# Patient Record
Sex: FEMALE | Race: WHITE | NOT HISPANIC OR LATINO | Employment: OTHER | URBAN - METROPOLITAN AREA
[De-identification: names, ages, dates, MRNs, and addresses within clinical notes are randomized per-mention and may not be internally consistent; named-entity substitution may affect disease eponyms.]

---

## 2024-04-05 ENCOUNTER — HOSPITAL ENCOUNTER (EMERGENCY)
Facility: HOSPITAL | Age: 64
Discharge: HOME | End: 2024-04-05
Attending: EMERGENCY MEDICINE
Payer: MEDICARE

## 2024-04-05 ENCOUNTER — APPOINTMENT (OUTPATIENT)
Dept: RADIOLOGY | Facility: HOSPITAL | Age: 64
End: 2024-04-05
Payer: MEDICARE

## 2024-04-05 VITALS
RESPIRATION RATE: 16 BRPM | DIASTOLIC BLOOD PRESSURE: 80 MMHG | TEMPERATURE: 97.5 F | HEART RATE: 86 BPM | BODY MASS INDEX: 28.25 KG/M2 | WEIGHT: 180 LBS | OXYGEN SATURATION: 96 % | SYSTOLIC BLOOD PRESSURE: 164 MMHG | HEIGHT: 67 IN

## 2024-04-05 DIAGNOSIS — M25.511 ACUTE PAIN OF RIGHT SHOULDER: Primary | ICD-10-CM

## 2024-04-05 PROCEDURE — 73030 X-RAY EXAM OF SHOULDER: CPT | Mod: RT

## 2024-04-05 PROCEDURE — 73030 X-RAY EXAM OF SHOULDER: CPT | Mod: RIGHT SIDE | Performed by: RADIOLOGY

## 2024-04-05 PROCEDURE — 99284 EMERGENCY DEPT VISIT MOD MDM: CPT | Performed by: EMERGENCY MEDICINE

## 2024-04-05 PROCEDURE — 99283 EMERGENCY DEPT VISIT LOW MDM: CPT | Mod: 25

## 2024-04-05 PROCEDURE — 2500000005 HC RX 250 GENERAL PHARMACY W/O HCPCS: Performed by: EMERGENCY MEDICINE

## 2024-04-05 RX ORDER — LIDOCAINE 560 MG/1
1 PATCH PERCUTANEOUS; TOPICAL; TRANSDERMAL ONCE
Status: DISCONTINUED | OUTPATIENT
Start: 2024-04-05 | End: 2024-04-05 | Stop reason: HOSPADM

## 2024-04-05 RX ORDER — LIDOCAINE 560 MG/1
1 PATCH PERCUTANEOUS; TOPICAL; TRANSDERMAL DAILY
Qty: 10 PATCH | Refills: 0 | Status: SHIPPED | OUTPATIENT
Start: 2024-04-05

## 2024-04-05 RX ADMIN — LIDOCAINE 4% 1 PATCH: 40 PATCH TOPICAL at 21:05

## 2024-04-05 ASSESSMENT — PAIN SCALES - GENERAL
PAINLEVEL_OUTOF10: 4
PAINLEVEL_OUTOF10: 10 - WORST POSSIBLE PAIN
PAINLEVEL_OUTOF10: 9

## 2024-04-05 ASSESSMENT — PAIN - FUNCTIONAL ASSESSMENT
PAIN_FUNCTIONAL_ASSESSMENT: 0-10
PAIN_FUNCTIONAL_ASSESSMENT: 0-10

## 2024-04-05 ASSESSMENT — LIFESTYLE VARIABLES
HAVE PEOPLE ANNOYED YOU BY CRITICIZING YOUR DRINKING: NO
TOTAL SCORE: 0
HAVE YOU EVER FELT YOU SHOULD CUT DOWN ON YOUR DRINKING: NO
EVER FELT BAD OR GUILTY ABOUT YOUR DRINKING: NO
EVER HAD A DRINK FIRST THING IN THE MORNING TO STEADY YOUR NERVES TO GET RID OF A HANGOVER: NO

## 2024-04-05 ASSESSMENT — COLUMBIA-SUICIDE SEVERITY RATING SCALE - C-SSRS
1. IN THE PAST MONTH, HAVE YOU WISHED YOU WERE DEAD OR WISHED YOU COULD GO TO SLEEP AND NOT WAKE UP?: NO
2. HAVE YOU ACTUALLY HAD ANY THOUGHTS OF KILLING YOURSELF?: NO
6. HAVE YOU EVER DONE ANYTHING, STARTED TO DO ANYTHING, OR PREPARED TO DO ANYTHING TO END YOUR LIFE?: NO

## 2024-04-05 ASSESSMENT — PAIN DESCRIPTION - PAIN TYPE: TYPE: ACUTE PAIN

## 2024-04-05 ASSESSMENT — PAIN DESCRIPTION - LOCATION
LOCATION: SHOULDER
LOCATION: SHOULDER

## 2024-04-05 ASSESSMENT — PAIN DESCRIPTION - ORIENTATION: ORIENTATION: RIGHT

## 2024-04-06 NOTE — ED PROVIDER NOTES
HPI   Chief Complaint   Patient presents with    Shoulder Injury     Right shoulder injury walking dog. Dog took off running and pt grabbed harness hurting shoulder.       63-year-old female presenting to the emergency department with right shoulder pain.  Patient was walking with her neighbor and her dog about 2 hours prior to arrival.  Dog took off and the patient tried to grab the dog's harness with her right arm causing her to fall over and landed on the right shoulder.  She states that she did hit her head lightly on muddy ground.  No loss of consciousness.  She not having any headache.  Patient is not on blood thinners.  No vision changes.  No numbness tingling weakness in the arms or legs.  No difficulty ambulating.  Patient Nuys any chest pain or trouble breathing.  She denies any other injuries.  No neck pain.                        [place] Coma Scale Score: 15                     Patient History   No past medical history on file.  No past surgical history on file.  No family history on file.  Social History     Tobacco Use    Smoking status: Not on file    Smokeless tobacco: Not on file   Substance Use Topics    Alcohol use: Not on file    Drug use: Not on file       Physical Exam   ED Triage Vitals [04/05/24 2002]   Temperature Heart Rate Respirations BP   36.4 °C (97.5 °F) 88 18 (!) 192/93      Pulse Ox Temp Source Heart Rate Source Patient Position   96 % Temporal -- --      BP Location FiO2 (%)     -- --       Physical Exam  Vitals and nursing note reviewed.   Constitutional:       General: She is not in acute distress.     Appearance: Normal appearance.   HENT:      Head: Normocephalic and atraumatic.      Comments: No skull or facial bone tenderness with palpation.  No raccoon eyes.  No Frey sign.  No hemotympanum.  No nasal septal hematoma.     Right Ear: Tympanic membrane normal.      Left Ear: Tympanic membrane normal.      Nose: Nose normal.      Mouth/Throat:      Mouth: Mucous membranes are  moist.   Eyes:      Extraocular Movements: Extraocular movements intact.      Pupils: Pupils are equal, round, and reactive to light.   Neck:      Comments: No C-spine tenderness to palpation  Cardiovascular:      Rate and Rhythm: Normal rate and regular rhythm.      Pulses: Normal pulses.   Pulmonary:      Effort: Pulmonary effort is normal. No respiratory distress.      Breath sounds: Normal breath sounds.   Abdominal:      General: Abdomen is flat.   Musculoskeletal:      Cervical back: Normal range of motion. No tenderness.      Comments: Limited range of motion of the right shoulder secondary to pain.  She has intact sensation equal or the bilateral lateral deltoids.  Patient has strong palpable radial pulse in the right upper extremity.  Normal sensation radial ulnar and median nerve distribution with intact strength in these distributions of the right arm as well.  No significant bony tenderness with palpation of the right shoulder.  No other bony tenderness with palpation of the right upper extremity.  No other traumatic injuries are noted   Skin:     General: Skin is warm.      Capillary Refill: Capillary refill takes less than 2 seconds.   Neurological:      General: No focal deficit present.      Mental Status: She is alert and oriented to person, place, and time.      Cranial Nerves: No cranial nerve deficit.      Sensory: No sensory deficit.      Motor: No weakness.      Gait: Gait normal.         ED Course & MDM   Diagnoses as of 04/07/24 1602   Acute pain of right shoulder       Medical Decision Making  Patient presenting with isolated right shoulder pain after a fall.  She did hit her head, but did not lose consciousness.  Not on blood thinners.  No headache.  GCS 15, alert and oriented x 4, normal neurologic exam, no clinical sign of basilar skull fracture.  Nonconcerning mechanism.  I do not feel that CT of the brain is necessary at this time.  She has no midline spinous process tenderness, normal  neurologic exam, is not intoxicated, no pain with rotation of the neck 45 degrees to the left to the right, no hyperextension or hyperflexion injury.  Do not feel that CT scan of the cervical spine is necessary at this time.      Plan to get x-ray of the right shoulder.  No other traumatic injuries are noted on examination.        Procedure  Procedures     Cristo Martinez DO  04/07/24 5072

## 2024-04-06 NOTE — DISCHARGE INSTRUCTIONS
Keep your appointment with Dr. Matthews.    Use the Lidoderm patches as directed, 12 hours on 12 hours off.    Do gentle range of motion exercises.    Return with any chest pain, shortness of breath, severe headache, vision changes, numbness tingling weakness in the arms or legs, change in mental status, or any new or worsening symptoms.   We can move patient to hospital follow up for 6/15/21 at 10 am (currently this spot is open)

## 2024-04-30 ENCOUNTER — LAB (OUTPATIENT)
Dept: LAB | Facility: LAB | Age: 64
End: 2024-04-30
Payer: MEDICARE

## 2024-04-30 ENCOUNTER — PRE-ADMISSION TESTING (OUTPATIENT)
Dept: PREADMISSION TESTING | Facility: HOSPITAL | Age: 64
End: 2024-04-30
Payer: MEDICARE

## 2024-04-30 VITALS
HEART RATE: 87 BPM | BODY MASS INDEX: 28.41 KG/M2 | WEIGHT: 181 LBS | RESPIRATION RATE: 16 BRPM | OXYGEN SATURATION: 96 % | DIASTOLIC BLOOD PRESSURE: 84 MMHG | TEMPERATURE: 98.1 F | HEIGHT: 67 IN | SYSTOLIC BLOOD PRESSURE: 161 MMHG

## 2024-04-30 DIAGNOSIS — Z01.818 PRE-OP TESTING: ICD-10-CM

## 2024-04-30 DIAGNOSIS — M75.121 COMPLETE TEAR OF RIGHT ROTATOR CUFF, UNSPECIFIED WHETHER TRAUMATIC: ICD-10-CM

## 2024-04-30 DIAGNOSIS — Z01.818 PRE-OP TESTING: Primary | ICD-10-CM

## 2024-04-30 DIAGNOSIS — R73.09 ELEVATED HEMOGLOBIN A1C: ICD-10-CM

## 2024-04-30 LAB
ANION GAP SERPL CALC-SCNC: 19 MMOL/L (ref 10–20)
BUN SERPL-MCNC: 36 MG/DL (ref 6–23)
CALCIUM SERPL-MCNC: 10.8 MG/DL (ref 8.6–10.3)
CHLORIDE SERPL-SCNC: 99 MMOL/L (ref 98–107)
CO2 SERPL-SCNC: 25 MMOL/L (ref 21–32)
CREAT SERPL-MCNC: 1.03 MG/DL (ref 0.5–1.05)
EGFRCR SERPLBLD CKD-EPI 2021: 61 ML/MIN/1.73M*2
ERYTHROCYTE [DISTWIDTH] IN BLOOD BY AUTOMATED COUNT: 20.9 % (ref 11.5–14.5)
EST. AVERAGE GLUCOSE BLD GHB EST-MCNC: 177 MG/DL
GLUCOSE SERPL-MCNC: 130 MG/DL (ref 74–99)
HBA1C MFR BLD: 7.8 %
HCT VFR BLD AUTO: 64.6 % (ref 36–46)
HGB BLD-MCNC: 19.2 G/DL (ref 12–16)
MCH RBC QN AUTO: 24.2 PG (ref 26–34)
MCHC RBC AUTO-ENTMCNC: 29.7 G/DL (ref 32–36)
MCV RBC AUTO: 81 FL (ref 80–100)
NRBC BLD-RTO: 0 /100 WBCS (ref 0–0)
PLATELET # BLD AUTO: 593 X10*3/UL (ref 150–450)
POTASSIUM SERPL-SCNC: 5.2 MMOL/L (ref 3.5–5.3)
RBC # BLD AUTO: 7.95 X10*6/UL (ref 4–5.2)
SODIUM SERPL-SCNC: 138 MMOL/L (ref 136–145)
WBC # BLD AUTO: 24.6 X10*3/UL (ref 4.4–11.3)

## 2024-04-30 PROCEDURE — 93005 ELECTROCARDIOGRAM TRACING: CPT

## 2024-04-30 PROCEDURE — 83036 HEMOGLOBIN GLYCOSYLATED A1C: CPT

## 2024-04-30 PROCEDURE — 87081 CULTURE SCREEN ONLY: CPT | Mod: STJLAB

## 2024-04-30 PROCEDURE — 99204 OFFICE O/P NEW MOD 45 MIN: CPT | Performed by: NURSE PRACTITIONER

## 2024-04-30 PROCEDURE — 80048 BASIC METABOLIC PNL TOTAL CA: CPT

## 2024-04-30 PROCEDURE — 36415 COLL VENOUS BLD VENIPUNCTURE: CPT

## 2024-04-30 PROCEDURE — 85027 COMPLETE CBC AUTOMATED: CPT

## 2024-04-30 RX ORDER — METFORMIN HYDROCHLORIDE 500 MG/1
1000 TABLET, EXTENDED RELEASE ORAL
COMMUNITY

## 2024-04-30 RX ORDER — CHLORTHALIDONE 25 MG/1
25 TABLET ORAL NIGHTLY
COMMUNITY

## 2024-04-30 RX ORDER — ATORVASTATIN CALCIUM 40 MG/1
40 TABLET, FILM COATED ORAL NIGHTLY
COMMUNITY

## 2024-04-30 RX ORDER — CHLORHEXIDINE GLUCONATE ORAL RINSE 1.2 MG/ML
SOLUTION DENTAL
Qty: 473 ML | Refills: 0 | Status: SHIPPED | OUTPATIENT
Start: 2024-04-30

## 2024-04-30 RX ORDER — LISINOPRIL 20 MG/1
20 TABLET ORAL NIGHTLY
COMMUNITY

## 2024-04-30 RX ORDER — AMLODIPINE BESYLATE 5 MG/1
10 TABLET ORAL NIGHTLY
COMMUNITY

## 2024-04-30 RX ORDER — DULAGLUTIDE 4.5 MG/.5ML
4.5 INJECTION, SOLUTION SUBCUTANEOUS
COMMUNITY

## 2024-04-30 ASSESSMENT — DUKE ACTIVITY SCORE INDEX (DASI)
CAN YOU DO YARD WORK LIKE RAKING LEAVES, WEEDING OR PUSHING A MOWER: YES
TOTAL_SCORE: 28.7
CAN YOU PARTICIPATE IN STRENOUS SPORTS LIKE SWIMMING, SINGLES TENNIS, FOOTBALL, BASKETBALL, OR SKIING: NO
CAN YOU HAVE SEXUAL RELATIONS: YES
CAN YOU DO LIGHT WORK AROUND THE HOUSE LIKE DUSTING OR WASHING DISHES: YES
CAN YOU PARTICIPATE IN MODERATE RECREATIONAL ACTIVITIES LIKE GOLF, BOWLING, DANCING, DOUBLES TENNIS OR THROWING A BASEBALL OR FOOTBALL: NO
CAN YOU TAKE CARE OF YOURSELF (EAT, DRESS, BATHE, OR USE TOILET): YES
CAN YOU DO HEAVY WORK AROUND THE HOUSE LIKE SCRUBBING FLOORS OR LIFTING AND MOVING HEAVY FURNITURE: NO
DASI METS SCORE: 6.3
CAN YOU WALK INDOORS, SUCH AS AROUND YOUR HOUSE: YES
CAN YOU DO MODERATE WORK AROUND THE HOUSE LIKE VACUUMING, SWEEPING FLOORS OR CARRYING GROCERIES: YES
CAN YOU CLIMB A FLIGHT OF STAIRS OR WALK UP A HILL: YES
CAN YOU WALK A BLOCK OR TWO ON LEVEL GROUND: YES
CAN YOU RUN A SHORT DISTANCE: NO

## 2024-04-30 ASSESSMENT — ACTIVITIES OF DAILY LIVING (ADL): ADL_SCORE: 0

## 2024-04-30 ASSESSMENT — LIFESTYLE VARIABLES: SMOKING_STATUS: NONSMOKER

## 2024-04-30 NOTE — PREPROCEDURE INSTRUCTIONS
Medication List            Accurate as of April 30, 2024 10:30 AM. Always use your most recent med list.                amLODIPine 5 mg tablet  Commonly known as: Norvasc  Medication Adjustments for Surgery: Take morning of surgery with sip of water, no other fluids     atorvastatin 40 mg tablet  Commonly known as: Lipitor  Medication Adjustments for Surgery: Other (Comment)  Notes to patient: May take the morning of surgery if this medication is prescribed to take in the mornings     chlorhexidine 0.12 % solution  Commonly known as: Peridex  Swish and spit 15 ml for 2 doses, 15mL the night before surgery and 15 ml morning of surgery - swish for 30 seconds -DO NOT SWALLOW, SPIT OUT  Medication Adjustments for Surgery: Other (Comment)  Notes to patient: As indicated     chlorthalidone 25 mg tablet  Commonly known as: Hygroton  Medication Adjustments for Surgery: Take morning of surgery with sip of water, no other fluids     lidocaine 4 % patch  Place 1 patch over 12 hours on the skin once daily. Remove & discard patch within 12 hours or as directed by MD.  Medication Adjustments for Surgery: Continue until night before surgery     lisinopril 20 mg tablet  Medication Adjustments for Surgery: Other (Comment)  Notes to patient: HOLD any evening dose the night before the day of surgery  HOLD the day of surgery  -should be discontinued for a minimum of 24 hours before surgery     metFORMIN  mg 24 hr tablet  Commonly known as: Glucophage-XR  Medication Adjustments for Surgery: Other (Comment)  Notes to patient: HOLD any evening dose the night before the day of surgery  HOLD the day of surgery     Trulicity 4.5 mg/0.5 mL pen injector  Generic drug: dulaglutide  Medication Adjustments for Surgery: Stop 7 days before surgery                  PRE-OPERATIVE INSTRUCTIONS    You will receive notification one business day prior to your procedure to confirm your arrival time. It is important that you answer your phone  and/or check your messages during this time. If you do not hear from the surgery center by 5 pm. the day before your procedure, please call [phone].     Please enter the building through the Outpatient entrance and take the elevator off the lobby to the 2nd floor then check in at the Outpatient Surgery desk on the 2nd floor.    INSTRUCTIONS:  Talk to your surgeon for instructions if you should stop your aspirin, blood thinner, or diabetes medicines.  DO NOT take any multivitamins or over the counter supplements for 7-10 days before surgery.  If not being admitted, you must have an adult immediately available to drive you home after surgery. We also highly recommend you have someone stay with you for the entire day and night of your surgery.  For children having surgery, a parent or legal guardian must accompany them to the surgery center. If this is not possible, please call [phone] to make additional arrangements.  For adults who are unable to consent or make medical decisions for themselves, a legal guardian or Power of  must accompany them to the surgery center. If this is not possible, please call [phone] to make additional arrangements.  Wear comfortable, loose fitting clothing.  All jewelry and piercings must be removed. If you are unable to remove an item or have a dermal piercing, please be sure to tell the nurse when you arrive for surgery.  Nail polish and make-up must be removed.  Avoid smoking or consuming alcohol for 24 hours before surgery.  To help prevent infection, please take a shower/bath and wash your hair the night before and/or morning of surgery (or follow other specific bathing instructions provided).    Preoperative Fasting Guidelines    Why must I stop eating and drinking near surgery time?  With sedation, food or liquid in your stomach can enter your lungs causing serious complications  Increases nausea and vomiting    When do I need to stop eating and drinking  before my surgery?  Do not eat any solid food after midnight the night before your surgery/procedure unless otherwise instructed by your surgeon.   You may have up to 13.5 ounces of clear liquid until TWO hours before your instructed arrival time to the hospital.  This includes water, black tea/coffee, (no milk or cream) apple juice, and electrolyte drinks (Gatorade).   You may chew gum until TWO hours before your surgery/procedure      If applicable, notify your surgeons office immediately of any new skin changes that occur to the surgical limb.      If you have any questions or concerns, please call Pre-Admission Testing at [phone].

## 2024-04-30 NOTE — PREPROCEDURE INSTRUCTIONS
SKIN CHECK:  Please notify your surgeon if any new skin changes occur to surgical limb.

## 2024-04-30 NOTE — CPM/PAT H&P
CPM/PAT Evaluation       Name: Aleksandra Mejía (Aleksandra Mejía)  /Age: 1960/63 y.o.     In-Person       Chief Complaint: right shoulder pains    HPI    SB is a 64 yo female who has had right shoulder pains for a few years now but pain has intensified with limited ROM. She had imaging completed which showed a complete right rotator cuff tear. She was seen by orthopedics and subsequently scheduled for right reverse total shoulder arthroplasty. NO recent steroid injections. Skin is intact to surgical limb. Otherwise denies any recent illness, fever/chills, chest pains or shortness of breath.     Past Medical History:   Diagnosis Date    Hyperlipidemia     Hypertension     Shingles     Type 2 diabetes mellitus (Multi)      PCP: Dr. Reyes ([facility])    Hemoglobin A1C   Date Value Ref Range Status   2024 7.8 (H) 4.0 - 5.6 % Final        Past Surgical History:   Procedure Laterality Date    SHOULDER SURGERY      arthroscopy- over 30 years ago       Patient  has no history on file for sexual activity.    Family History   Problem Relation Name Age of Onset    Heart disease Mother      Cancer Mother      Diabetes Mother      Hypertension Mother      Heart disease Father      Diabetes Father      Hypertension Father      Diabetes Sister      Heart disease Brother      Diabetes Brother         Allergies   Allergen Reactions    Latex Shortness of breath, Other and Swelling     Trouble breathing       Prior to Admission medications    Medication Sig Start Date End Date Taking? Authorizing Provider   amLODIPine (Norvasc) 5 mg tablet Take 2 tablets (10 mg) by mouth once daily at bedtime.    Historical Provider, MD   atorvastatin (Lipitor) 40 mg tablet Take 1 tablet (40 mg) by mouth once daily at bedtime.    Historical Provider, MD   chlorhexidine (Peridex) 0.12 % solution Swish and spit 15 ml for 2 doses, 15mL the night before surgery and 15 ml morning of surgery - swish for 30 seconds -DO NOT SWALLOW, SPIT OUT 24    Lety Vizcarra, APRN-CNP   chlorthalidone (Hygroton) 25 mg tablet Take 1 tablet (25 mg) by mouth once daily at bedtime.    Historical Provider, MD   dulaglutide (Trulicity) 4.5 mg/0.5 mL pen injector Inject 4.5 mg under the skin 1 (one) time per week. On Saturdays    Historical Provider, MD   lidocaine 4 % patch Place 1 patch over 12 hours on the skin once daily. Remove & discard patch within 12 hours or as directed by MD. 4/5/24   Lazaro Solomon,    lisinopril 20 mg tablet Take 1 tablet (20 mg) by mouth once daily at bedtime.    Historical Provider, MD   metFORMIN  mg 24 hr tablet Take 2 tablets (1,000 mg) by mouth 2 times a day with meals.    Historical Provider, MD KEITH BURROWS   Constitutional: Negative for fever, chills, or sweats   ENMT: Negative for nasal discharge, congestion, ear pain, mouth pain, throat pain   Respiratory: Negative for cough, wheezing, shortness of breath   Cardiac: Negative for chest pain, dyspnea on exertion, palpitations   Gastrointestinal: Negative for nausea, vomiting, diarrhea, constipation, abdominal pain  Genitourinary: Negative for dysuria, flank pain, frequency, hematuria     Musculoskeletal: Positive for decreased ROM, pain, swelling, weakness in right shoulder    Neurological: Negative for dizziness, confusion, headache  Psychiatric: Negative for mood changes   Skin: Negative for itching, rash, ulcer    Hematologic/Lymph: Negative for bruising, easy bleeding  Allergic/Immunologic: Negative itching, sneezing, swelling      Physical Exam  HENT:      Head: Normocephalic.      Mouth/Throat:      Mouth: Mucous membranes are moist.   Eyes:      Extraocular Movements: Extraocular movements intact.   Cardiovascular:      Rate and Rhythm: Normal rate and regular rhythm.   Pulmonary:      Effort: Pulmonary effort is normal.      Breath sounds: Normal breath sounds.   Abdominal:      General: Abdomen is flat.      Palpations: Abdomen is soft.   Musculoskeletal:         General:  Normal range of motion.      Cervical back: Normal range of motion.   Skin:     General: Skin is warm and dry.   Neurological:      General: No focal deficit present.      Mental Status: She is alert.   Psychiatric:         Mood and Affect: Mood normal.        PAT AIRWAY:   Airway:     Neck ROM::  Full  normal      Anesthesia:  Patient denies any anesthesia complications.   -shoulder arthroscopy was about 30 years ago     Visit Vitals  /84   Pulse 87   Temp 36.7 °C (98.1 °F) (Temporal)   Resp 16       DASI Risk Score      Flowsheet Row Most Recent Value   DASI SCORE 28.7   METS Score (Will be calculated only when all the questions are answered) 6.3          Caprini DVT Assessment      Flowsheet Row Most Recent Value   DVT Score 5   Current Status Major surgery planned, including arthroscopic and laproscopic (1-2 hours)   Age 60-75 years   BMI 30 or less          Modified Frailty Index      Flowsheet Row Most Recent Value   Modified Frailty Index Calculator .1818          CHADS2 Stroke Risk  Current as of today        N/A 3 to 100%: High Risk   2 to < 3%: Medium Risk   0 to < 2%: Low Risk     Last Change: N/A          This score determines the patient's risk of having a stroke if the patient has atrial fibrillation.        This score is not applicable to this patient. Components are not calculated.          Revised Cardiac Risk Index      Flowsheet Row Most Recent Value   Revised Cardiac Risk Calculator 0          Apfel Simplified Score    No data to display       Risk Analysis Index Results This Encounter         4/30/2024  1024             [place] Cancer History: Patient does not indicate history of cancer    Total Risk Analysis Index Score Without Cancer: 18    Total Risk Analysis Index Score: 18          Stop Bang Score      Flowsheet Row Most Recent Value   Do you snore loudly? 0   Do you often feel tired or fatigued after your sleep? 0   Has anyone ever observed you stop breathing in your sleep? 0   Do you  have or are you being treated for high blood pressure? 1   Recent BMI (Calculated) 28.3   Is BMI greater than 35 kg/m2? 0=No   Age older than 50 years old? 1=Yes   Is your neck circumference greater than 17 inches (Male) or 16 inches (Female)? 0   Gender - Male 0=No   STOP-BANG Total Score 2            Assessment and Plan:     63-year-old female scheduled for right reverse total shoulder arthroplasty on 5/14/2024 with Dr. Matthews. Blood work with MRSA ordered-or chlorhexidine prescribed.  EKG shows  NSR with v rate of 71 bpm. She has been instructed to notify surgeon if any new skin changes occur to surgical limb prior to surgery. Otherwise no further orders indicated.     Anesthesia:  Patient denies any anesthesia complications.   -shoulder arthroscopy was about 30 years ago     See risk scores as previously documented.

## 2024-04-30 NOTE — H&P (VIEW-ONLY)
CPM/PAT Evaluation       Name: Aleksandra Mejía (Aleksandra Mejía)  /Age: 1960/63 y.o.     In-Person       Chief Complaint: right shoulder pains    HPI    SB is a 62 yo female who has had right shoulder pains for a few years now but pain has intensified with limited ROM. She had imaging completed which showed a complete right rotator cuff tear. She was seen by orthopedics and subsequently scheduled for right reverse total shoulder arthroplasty. NO recent steroid injections. Skin is intact to surgical limb. Otherwise denies any recent illness, fever/chills, chest pains or shortness of breath.     Past Medical History:   Diagnosis Date    Hyperlipidemia     Hypertension     Shingles     Type 2 diabetes mellitus (Multi)      PCP: Dr. Reyes ([facility])    Hemoglobin A1C   Date Value Ref Range Status   2024 7.8 (H) 4.0 - 5.6 % Final        Past Surgical History:   Procedure Laterality Date    SHOULDER SURGERY      arthroscopy- over 30 years ago       Patient  has no history on file for sexual activity.    Family History   Problem Relation Name Age of Onset    Heart disease Mother      Cancer Mother      Diabetes Mother      Hypertension Mother      Heart disease Father      Diabetes Father      Hypertension Father      Diabetes Sister      Heart disease Brother      Diabetes Brother         Allergies   Allergen Reactions    Latex Shortness of breath, Other and Swelling     Trouble breathing       Prior to Admission medications    Medication Sig Start Date End Date Taking? Authorizing Provider   amLODIPine (Norvasc) 5 mg tablet Take 2 tablets (10 mg) by mouth once daily at bedtime.    Historical Provider, MD   atorvastatin (Lipitor) 40 mg tablet Take 1 tablet (40 mg) by mouth once daily at bedtime.    Historical Provider, MD   chlorhexidine (Peridex) 0.12 % solution Swish and spit 15 ml for 2 doses, 15mL the night before surgery and 15 ml morning of surgery - swish for 30 seconds -DO NOT SWALLOW, SPIT OUT 24    Lety Vizcarra, APRN-CNP   chlorthalidone (Hygroton) 25 mg tablet Take 1 tablet (25 mg) by mouth once daily at bedtime.    Historical Provider, MD   dulaglutide (Trulicity) 4.5 mg/0.5 mL pen injector Inject 4.5 mg under the skin 1 (one) time per week. On Saturdays    Historical Provider, MD   lidocaine 4 % patch Place 1 patch over 12 hours on the skin once daily. Remove & discard patch within 12 hours or as directed by MD. 4/5/24   Lazaro Solomon,    lisinopril 20 mg tablet Take 1 tablet (20 mg) by mouth once daily at bedtime.    Historical Provider, MD   metFORMIN  mg 24 hr tablet Take 2 tablets (1,000 mg) by mouth 2 times a day with meals.    Historical Provider, MD KEITH BURROWS   Constitutional: Negative for fever, chills, or sweats   ENMT: Negative for nasal discharge, congestion, ear pain, mouth pain, throat pain   Respiratory: Negative for cough, wheezing, shortness of breath   Cardiac: Negative for chest pain, dyspnea on exertion, palpitations   Gastrointestinal: Negative for nausea, vomiting, diarrhea, constipation, abdominal pain  Genitourinary: Negative for dysuria, flank pain, frequency, hematuria     Musculoskeletal: Positive for decreased ROM, pain, swelling, weakness in right shoulder    Neurological: Negative for dizziness, confusion, headache  Psychiatric: Negative for mood changes   Skin: Negative for itching, rash, ulcer    Hematologic/Lymph: Negative for bruising, easy bleeding  Allergic/Immunologic: Negative itching, sneezing, swelling      Physical Exam  HENT:      Head: Normocephalic.      Mouth/Throat:      Mouth: Mucous membranes are moist.   Eyes:      Extraocular Movements: Extraocular movements intact.   Cardiovascular:      Rate and Rhythm: Normal rate and regular rhythm.   Pulmonary:      Effort: Pulmonary effort is normal.      Breath sounds: Normal breath sounds.   Abdominal:      General: Abdomen is flat.      Palpations: Abdomen is soft.   Musculoskeletal:         General:  Normal range of motion.      Cervical back: Normal range of motion.   Skin:     General: Skin is warm and dry.   Neurological:      General: No focal deficit present.      Mental Status: She is alert.   Psychiatric:         Mood and Affect: Mood normal.        PAT AIRWAY:   Airway:     Neck ROM::  Full  normal      Anesthesia:  Patient denies any anesthesia complications.   -shoulder arthroscopy was about 30 years ago     Visit Vitals  /84   Pulse 87   Temp 36.7 °C (98.1 °F) (Temporal)   Resp 16       DASI Risk Score      Flowsheet Row Most Recent Value   DASI SCORE 28.7   METS Score (Will be calculated only when all the questions are answered) 6.3          Caprini DVT Assessment      Flowsheet Row Most Recent Value   DVT Score 5   Current Status Major surgery planned, including arthroscopic and laproscopic (1-2 hours)   Age 60-75 years   BMI 30 or less          Modified Frailty Index      Flowsheet Row Most Recent Value   Modified Frailty Index Calculator .1818          CHADS2 Stroke Risk  Current as of today        N/A 3 to 100%: High Risk   2 to < 3%: Medium Risk   0 to < 2%: Low Risk     Last Change: N/A          This score determines the patient's risk of having a stroke if the patient has atrial fibrillation.        This score is not applicable to this patient. Components are not calculated.          Revised Cardiac Risk Index      Flowsheet Row Most Recent Value   Revised Cardiac Risk Calculator 0          Apfel Simplified Score    No data to display       Risk Analysis Index Results This Encounter         4/30/2024  1024             [place] Cancer History: Patient does not indicate history of cancer    Total Risk Analysis Index Score Without Cancer: 18    Total Risk Analysis Index Score: 18          Stop Bang Score      Flowsheet Row Most Recent Value   Do you snore loudly? 0   Do you often feel tired or fatigued after your sleep? 0   Has anyone ever observed you stop breathing in your sleep? 0   Do you  have or are you being treated for high blood pressure? 1   Recent BMI (Calculated) 28.3   Is BMI greater than 35 kg/m2? 0=No   Age older than 50 years old? 1=Yes   Is your neck circumference greater than 17 inches (Male) or 16 inches (Female)? 0   Gender - Male 0=No   STOP-BANG Total Score 2            Assessment and Plan:     63-year-old female scheduled for right reverse total shoulder arthroplasty on 5/14/2024 with Dr. Matthews. Blood work with MRSA ordered-or chlorhexidine prescribed.  EKG shows  NSR with v rate of 71 bpm. She has been instructed to notify surgeon if any new skin changes occur to surgical limb prior to surgery. Otherwise no further orders indicated.     Anesthesia:  Patient denies any anesthesia complications.   -shoulder arthroscopy was about 30 years ago     See risk scores as previously documented.

## 2024-05-02 LAB — STAPHYLOCOCCUS SPEC CULT: ABNORMAL

## 2024-05-03 LAB
ATRIAL RATE: 71 BPM
P AXIS: 41 DEGREES
P OFFSET: 194 MS
P ONSET: 140 MS
PR INTERVAL: 164 MS
Q ONSET: 222 MS
QRS COUNT: 12 BEATS
QRS DURATION: 80 MS
QT INTERVAL: 382 MS
QTC CALCULATION(BAZETT): 415 MS
QTC FREDERICIA: 404 MS
R AXIS: 3 DEGREES
T AXIS: 20 DEGREES
T OFFSET: 413 MS
VENTRICULAR RATE: 71 BPM

## 2024-05-14 ENCOUNTER — HOSPITAL ENCOUNTER (OUTPATIENT)
Facility: HOSPITAL | Age: 64
Setting detail: OUTPATIENT SURGERY
Discharge: HOME | End: 2024-05-14
Attending: ORTHOPAEDIC SURGERY | Admitting: ORTHOPAEDIC SURGERY
Payer: MEDICARE

## 2024-05-14 ENCOUNTER — ANESTHESIA (OUTPATIENT)
Dept: OPERATING ROOM | Facility: HOSPITAL | Age: 64
End: 2024-05-14
Payer: MEDICARE

## 2024-05-14 ENCOUNTER — ANESTHESIA EVENT (OUTPATIENT)
Dept: OPERATING ROOM | Facility: HOSPITAL | Age: 64
End: 2024-05-14
Payer: MEDICARE

## 2024-05-14 VITALS
TEMPERATURE: 97.5 F | HEART RATE: 107 BPM | RESPIRATION RATE: 16 BRPM | OXYGEN SATURATION: 93 % | DIASTOLIC BLOOD PRESSURE: 67 MMHG | SYSTOLIC BLOOD PRESSURE: 122 MMHG

## 2024-05-14 LAB — GLUCOSE BLD MANUAL STRIP-MCNC: 165 MG/DL (ref 74–99)

## 2024-05-14 PROCEDURE — 2500000004 HC RX 250 GENERAL PHARMACY W/ HCPCS (ALT 636 FOR OP/ED): Mod: JZ | Performed by: ORTHOPAEDIC SURGERY

## 2024-05-14 PROCEDURE — 2720000007 HC OR 272 NO HCPCS: Performed by: ORTHOPAEDIC SURGERY

## 2024-05-14 PROCEDURE — A6213 FOAM DRG >16<=48 SQ IN W/BDR: HCPCS | Performed by: ORTHOPAEDIC SURGERY

## 2024-05-14 PROCEDURE — 7100000010 HC PHASE TWO TIME - EACH INCREMENTAL 1 MINUTE: Performed by: ORTHOPAEDIC SURGERY

## 2024-05-14 PROCEDURE — 3600000005 HC OR TIME - INITIAL BASE CHARGE - PROCEDURE LEVEL FIVE: Performed by: ORTHOPAEDIC SURGERY

## 2024-05-14 PROCEDURE — A23472 PR RECONSTR TOTAL SHOULDER IMPLANT: Performed by: STUDENT IN AN ORGANIZED HEALTH CARE EDUCATION/TRAINING PROGRAM

## 2024-05-14 PROCEDURE — C1776 JOINT DEVICE (IMPLANTABLE): HCPCS | Performed by: ORTHOPAEDIC SURGERY

## 2024-05-14 PROCEDURE — 3600000010 HC OR TIME - EACH INCREMENTAL 1 MINUTE - PROCEDURE LEVEL FIVE: Performed by: ORTHOPAEDIC SURGERY

## 2024-05-14 PROCEDURE — C1769 GUIDE WIRE: HCPCS | Performed by: ORTHOPAEDIC SURGERY

## 2024-05-14 PROCEDURE — 82947 ASSAY GLUCOSE BLOOD QUANT: CPT

## 2024-05-14 PROCEDURE — 7100000002 HC RECOVERY ROOM TIME - EACH INCREMENTAL 1 MINUTE: Performed by: ORTHOPAEDIC SURGERY

## 2024-05-14 PROCEDURE — C1713 ANCHOR/SCREW BN/BN,TIS/BN: HCPCS | Performed by: ORTHOPAEDIC SURGERY

## 2024-05-14 PROCEDURE — 3700000002 HC GENERAL ANESTHESIA TIME - EACH INCREMENTAL 1 MINUTE: Performed by: ORTHOPAEDIC SURGERY

## 2024-05-14 PROCEDURE — 2500000004 HC RX 250 GENERAL PHARMACY W/ HCPCS (ALT 636 FOR OP/ED): Performed by: STUDENT IN AN ORGANIZED HEALTH CARE EDUCATION/TRAINING PROGRAM

## 2024-05-14 PROCEDURE — 64415 NJX AA&/STRD BRCH PLXS IMG: CPT | Performed by: STUDENT IN AN ORGANIZED HEALTH CARE EDUCATION/TRAINING PROGRAM

## 2024-05-14 PROCEDURE — 3700000001 HC GENERAL ANESTHESIA TIME - INITIAL BASE CHARGE: Performed by: ORTHOPAEDIC SURGERY

## 2024-05-14 PROCEDURE — 7100000001 HC RECOVERY ROOM TIME - INITIAL BASE CHARGE: Performed by: ORTHOPAEDIC SURGERY

## 2024-05-14 PROCEDURE — 2500000005 HC RX 250 GENERAL PHARMACY W/O HCPCS: Performed by: ANESTHESIOLOGIST ASSISTANT

## 2024-05-14 PROCEDURE — 2780000003 HC OR 278 NO HCPCS: Performed by: ORTHOPAEDIC SURGERY

## 2024-05-14 PROCEDURE — 2500000004 HC RX 250 GENERAL PHARMACY W/ HCPCS (ALT 636 FOR OP/ED): Performed by: ANESTHESIOLOGIST ASSISTANT

## 2024-05-14 PROCEDURE — A23472 PR RECONSTR TOTAL SHOULDER IMPLANT: Performed by: ANESTHESIOLOGIST ASSISTANT

## 2024-05-14 PROCEDURE — 7100000009 HC PHASE TWO TIME - INITIAL BASE CHARGE: Performed by: ORTHOPAEDIC SURGERY

## 2024-05-14 DEVICE — HUMERAL CUP
Type: IMPLANTABLE DEVICE | Site: SHOULDER | Status: FUNCTIONAL
Brand: REUNION

## 2024-05-14 DEVICE — IMPLANTABLE DEVICE: Type: IMPLANTABLE DEVICE | Site: SHOULDER | Status: FUNCTIONAL

## 2024-05-14 DEVICE — 6.5MM CENTER SCREW
Type: IMPLANTABLE DEVICE | Site: SHOULDER | Status: FUNCTIONAL
Brand: REUNION

## 2024-05-14 DEVICE — X3 HUMERAL INSERT
Type: IMPLANTABLE DEVICE | Site: SHOULDER | Status: FUNCTIONAL
Brand: REUNION

## 2024-05-14 DEVICE — 4.5MM PERIPHERAL SCREW
Type: IMPLANTABLE DEVICE | Site: SHOULDER | Status: FUNCTIONAL
Brand: REUNION

## 2024-05-14 DEVICE — NITINOL PILOT WIRE
Type: IMPLANTABLE DEVICE | Site: SHOULDER | Status: NON-FUNCTIONAL
Brand: REUNION

## 2024-05-14 DEVICE — GLENOSPHERE , CONCENTRIC
Type: IMPLANTABLE DEVICE | Site: SHOULDER | Status: FUNCTIONAL
Brand: REUNION

## 2024-05-14 RX ORDER — SODIUM CHLORIDE, SODIUM LACTATE, POTASSIUM CHLORIDE, CALCIUM CHLORIDE 600; 310; 30; 20 MG/100ML; MG/100ML; MG/100ML; MG/100ML
INJECTION, SOLUTION INTRAVENOUS CONTINUOUS PRN
Status: DISCONTINUED | OUTPATIENT
Start: 2024-05-14 | End: 2024-05-14

## 2024-05-14 RX ORDER — OXYCODONE HYDROCHLORIDE 5 MG/1
5 TABLET ORAL EVERY 4 HOURS PRN
Status: DISCONTINUED | OUTPATIENT
Start: 2024-05-14 | End: 2024-05-14 | Stop reason: HOSPADM

## 2024-05-14 RX ORDER — PROPOFOL 10 MG/ML
INJECTION, EMULSION INTRAVENOUS AS NEEDED
Status: DISCONTINUED | OUTPATIENT
Start: 2024-05-14 | End: 2024-05-14

## 2024-05-14 RX ORDER — FENTANYL CITRATE 50 UG/ML
INJECTION, SOLUTION INTRAMUSCULAR; INTRAVENOUS AS NEEDED
Status: DISCONTINUED | OUTPATIENT
Start: 2024-05-14 | End: 2024-05-14

## 2024-05-14 RX ORDER — ONDANSETRON HYDROCHLORIDE 2 MG/ML
4 INJECTION, SOLUTION INTRAVENOUS ONCE AS NEEDED
Status: COMPLETED | OUTPATIENT
Start: 2024-05-14 | End: 2024-05-14

## 2024-05-14 RX ORDER — LABETALOL HYDROCHLORIDE 5 MG/ML
5 INJECTION, SOLUTION INTRAVENOUS ONCE AS NEEDED
Status: COMPLETED | OUTPATIENT
Start: 2024-05-14 | End: 2024-05-14

## 2024-05-14 RX ORDER — KETOROLAC TROMETHAMINE 30 MG/ML
INJECTION, SOLUTION INTRAMUSCULAR; INTRAVENOUS AS NEEDED
Status: DISCONTINUED | OUTPATIENT
Start: 2024-05-14 | End: 2024-05-14

## 2024-05-14 RX ORDER — ALBUTEROL SULFATE 0.83 MG/ML
2.5 SOLUTION RESPIRATORY (INHALATION) ONCE AS NEEDED
Status: DISCONTINUED | OUTPATIENT
Start: 2024-05-14 | End: 2024-05-14 | Stop reason: HOSPADM

## 2024-05-14 RX ORDER — SODIUM CHLORIDE, SODIUM LACTATE, POTASSIUM CHLORIDE, CALCIUM CHLORIDE 600; 310; 30; 20 MG/100ML; MG/100ML; MG/100ML; MG/100ML
100 INJECTION, SOLUTION INTRAVENOUS CONTINUOUS
Status: DISCONTINUED | OUTPATIENT
Start: 2024-05-14 | End: 2024-05-14 | Stop reason: HOSPADM

## 2024-05-14 RX ORDER — LIDOCAINE HYDROCHLORIDE 20 MG/ML
INJECTION, SOLUTION INFILTRATION; PERINEURAL AS NEEDED
Status: DISCONTINUED | OUTPATIENT
Start: 2024-05-14 | End: 2024-05-14

## 2024-05-14 RX ORDER — ROCURONIUM BROMIDE 10 MG/ML
INJECTION, SOLUTION INTRAVENOUS AS NEEDED
Status: DISCONTINUED | OUTPATIENT
Start: 2024-05-14 | End: 2024-05-14

## 2024-05-14 RX ORDER — CEFAZOLIN SODIUM 2 G/100ML
2 INJECTION, SOLUTION INTRAVENOUS ONCE
Status: COMPLETED | OUTPATIENT
Start: 2024-05-14 | End: 2024-05-14

## 2024-05-14 RX ORDER — ONDANSETRON HYDROCHLORIDE 2 MG/ML
INJECTION, SOLUTION INTRAVENOUS AS NEEDED
Status: DISCONTINUED | OUTPATIENT
Start: 2024-05-14 | End: 2024-05-14

## 2024-05-14 RX ORDER — MEPERIDINE HYDROCHLORIDE 50 MG/ML
12.5 INJECTION INTRAMUSCULAR; INTRAVENOUS; SUBCUTANEOUS EVERY 10 MIN PRN
Status: DISCONTINUED | OUTPATIENT
Start: 2024-05-14 | End: 2024-05-14 | Stop reason: HOSPADM

## 2024-05-14 RX ORDER — PHENYLEPHRINE HCL IN 0.9% NACL 1 MG/10 ML
SYRINGE (ML) INTRAVENOUS AS NEEDED
Status: DISCONTINUED | OUTPATIENT
Start: 2024-05-14 | End: 2024-05-14

## 2024-05-14 RX ORDER — FENTANYL CITRATE 50 UG/ML
25 INJECTION, SOLUTION INTRAMUSCULAR; INTRAVENOUS EVERY 5 MIN PRN
Status: DISCONTINUED | OUTPATIENT
Start: 2024-05-14 | End: 2024-05-14 | Stop reason: HOSPADM

## 2024-05-14 RX ORDER — MIDAZOLAM HYDROCHLORIDE 1 MG/ML
INJECTION, SOLUTION INTRAMUSCULAR; INTRAVENOUS AS NEEDED
Status: DISCONTINUED | OUTPATIENT
Start: 2024-05-14 | End: 2024-05-14

## 2024-05-14 RX ORDER — LIDOCAINE HYDROCHLORIDE 10 MG/ML
0.1 INJECTION INFILTRATION; PERINEURAL ONCE
Status: DISCONTINUED | OUTPATIENT
Start: 2024-05-14 | End: 2024-05-14 | Stop reason: HOSPADM

## 2024-05-14 RX ADMIN — Medication 100 MCG: at 09:52

## 2024-05-14 RX ADMIN — LABETALOL HYDROCHLORIDE 5 MG: 5 INJECTION, SOLUTION INTRAVENOUS at 11:06

## 2024-05-14 RX ADMIN — Medication 200 MCG: at 09:33

## 2024-05-14 RX ADMIN — Medication 150 MCG: at 10:23

## 2024-05-14 RX ADMIN — ONDANSETRON 4 MG: 2 INJECTION INTRAMUSCULAR; INTRAVENOUS at 12:32

## 2024-05-14 RX ADMIN — SODIUM CHLORIDE, POTASSIUM CHLORIDE, SODIUM LACTATE AND CALCIUM CHLORIDE: 600; 310; 30; 20 INJECTION, SOLUTION INTRAVENOUS at 09:33

## 2024-05-14 RX ADMIN — SUGAMMADEX 200 MG: 100 INJECTION, SOLUTION INTRAVENOUS at 10:50

## 2024-05-14 RX ADMIN — DEXAMETHASONE SODIUM PHOSPHATE 4 MG: 4 INJECTION, SOLUTION INTRAMUSCULAR; INTRAVENOUS at 08:42

## 2024-05-14 RX ADMIN — ROCURONIUM BROMIDE 10 MG: 10 INJECTION INTRAVENOUS at 09:17

## 2024-05-14 RX ADMIN — Medication 150 MCG: at 10:06

## 2024-05-14 RX ADMIN — ROCURONIUM BROMIDE 50 MG: 10 INJECTION INTRAVENOUS at 08:17

## 2024-05-14 RX ADMIN — PROPOFOL 200 MG: 10 INJECTION, EMULSION INTRAVENOUS at 08:17

## 2024-05-14 RX ADMIN — SODIUM CHLORIDE, POTASSIUM CHLORIDE, SODIUM LACTATE AND CALCIUM CHLORIDE: 600; 310; 30; 20 INJECTION, SOLUTION INTRAVENOUS at 08:10

## 2024-05-14 RX ADMIN — MIDAZOLAM 2 MG: 1 INJECTION INTRAMUSCULAR; INTRAVENOUS at 08:17

## 2024-05-14 RX ADMIN — CEFAZOLIN SODIUM 2 G: 2 INJECTION, SOLUTION INTRAVENOUS at 08:35

## 2024-05-14 RX ADMIN — ONDANSETRON 4 MG: 2 INJECTION INTRAMUSCULAR; INTRAVENOUS at 09:54

## 2024-05-14 RX ADMIN — FENTANYL CITRATE 50 MCG: 50 INJECTION, SOLUTION INTRAMUSCULAR; INTRAVENOUS at 10:14

## 2024-05-14 RX ADMIN — FENTANYL CITRATE 100 MCG: 50 INJECTION, SOLUTION INTRAMUSCULAR; INTRAVENOUS at 08:17

## 2024-05-14 RX ADMIN — MIDAZOLAM 2 MG: 1 INJECTION INTRAMUSCULAR; INTRAVENOUS at 07:56

## 2024-05-14 RX ADMIN — KETOROLAC TROMETHAMINE 30 MG: 30 INJECTION, SOLUTION INTRAMUSCULAR at 10:06

## 2024-05-14 RX ADMIN — LIDOCAINE HYDROCHLORIDE 80 MG: 20 INJECTION, SOLUTION INFILTRATION; PERINEURAL at 08:17

## 2024-05-14 SDOH — HEALTH STABILITY: MENTAL HEALTH: CURRENT SMOKER: 0

## 2024-05-14 ASSESSMENT — PAIN SCALES - GENERAL
PAINLEVEL_OUTOF10: 1
PAINLEVEL_OUTOF10: 0 - NO PAIN
PAINLEVEL_OUTOF10: 4
PAINLEVEL_OUTOF10: 4
PAINLEVEL_OUTOF10: 1
PAINLEVEL_OUTOF10: 6
PAINLEVEL_OUTOF10: 4
PAINLEVEL_OUTOF10: 1
PAIN_LEVEL: 0
PAINLEVEL_OUTOF10: 2

## 2024-05-14 ASSESSMENT — COLUMBIA-SUICIDE SEVERITY RATING SCALE - C-SSRS
6. HAVE YOU EVER DONE ANYTHING, STARTED TO DO ANYTHING, OR PREPARED TO DO ANYTHING TO END YOUR LIFE?: NO
1. IN THE PAST MONTH, HAVE YOU WISHED YOU WERE DEAD OR WISHED YOU COULD GO TO SLEEP AND NOT WAKE UP?: NO
2. HAVE YOU ACTUALLY HAD ANY THOUGHTS OF KILLING YOURSELF?: NO

## 2024-05-14 ASSESSMENT — PAIN - FUNCTIONAL ASSESSMENT
PAIN_FUNCTIONAL_ASSESSMENT: 0-10

## 2024-05-14 NOTE — ANESTHESIA POSTPROCEDURE EVALUATION
Patient: Aleksandra Mejía    Procedure Summary       Date: 05/14/24 Room / Location: [facility] OR  / [facility] OR    Anesthesia Start: 0811 Anesthesia Stop: 1058    Procedure: Right reverse total shoulder arthroplasty (Right: Shoulder) Diagnosis: (M75.121 - Complete rotator cuff tear or rupture of right shoulder, not specified as traumatic, M19.011 - Primary osteoarthritis, right shoulder)    Surgeons: Alphonso Matthews MD Responsible Provider: Santana Mathew DO    Anesthesia Type: general, regional ASA Status: 3            Anesthesia Type: general, regional    Vitals Value Taken Time   /86 05/14/24 1100   Temp 36.4 °C (97.5 °F) 05/14/24 1055   Pulse 103 05/14/24 1101   Resp 20 05/14/24 1101   SpO2 97 % 05/14/24 1101   Vitals shown include unfiled device data.    Anesthesia Post Evaluation    Patient location during evaluation: PACU  Patient participation: complete - patient participated  Level of consciousness: awake  Pain score: 0  Pain management: adequate  Multimodal analgesia pain management approach  Airway patency: patent  Two or more strategies used to mitigate risk of obstructive sleep apnea  Cardiovascular status: acceptable  Respiratory status: acceptable  Hydration status: acceptable  Postoperative Nausea and Vomiting: none        No notable events documented.

## 2024-05-14 NOTE — ANESTHESIA PROCEDURE NOTES
Airway  Date/Time: 5/14/2024 8:20 AM  Urgency: elective    Airway not difficult    Staffing  Performed: LESLEY   Authorized by: Santana Mathew DO    Performed by: LESLEY Cruz  Patient location during procedure: OR    Indications and Patient Condition  Indications for airway management: anesthesia  Spontaneous Ventilation: absent  Sedation level: deep  Preoxygenated: yes  Patient position: sniffing  Mask difficulty assessment: 1 - vent by mask    Final Airway Details  Final airway type: endotracheal airway      Successful airway: ETT  Cuffed: yes   Successful intubation technique: direct laryngoscopy  Facilitating devices/methods: intubating stylet  Endotracheal tube insertion site: oral  Blade: Ivelisse  Blade size: #3  ETT size (mm): 7.0  Cormack-Lehane Classification: grade I - full view of glottis  Placement verified by: chest auscultation and capnometry   Measured from: lips  ETT to lips (cm): 21  Number of attempts at approach: 1

## 2024-05-14 NOTE — OP NOTE
Right reverse total shoulder arthroplasty (R) Operative Note     Date: 2024  OR Location: [facility] OR    Name: Aleksandra Mejía : 1960, Age: 63 y.o., MRN: [id number], Sex: female      Preoperative diagnosis: Right rotator cuff arthropathy shoulder  Postoperative diagnosis: Right rotator cuff arthropathy shoulder, biceps tendon degeneration    Procedures  Right reverse total shoulder arthroplasty  [id number] - MI ARTHROPLASTY GLENOHUMERAL JOINT TOTAL SHOULDER  Right biceps tendon tenodesis    Surgeons      * Alphonso Matthews - Primary  First Assistant Dr. Lamont Patten    Resident/Fellow/Other Assistant:  First Assistant Dr. Lamont Patten    Procedure Summary  Anesthesia: General  ASA: III  Anesthesia Staff: Anesthesiologist: DO BLANCA Montana-AA: LESLEY Cruz  Estimated Blood Loss: 10 mL  Intra-op Medications:   Administrations occurring from 0745 to 0945 on 24:   Medication Name Total Dose   ceFAZolin in dextrose (iso-os) (Ancef) IVPB 2 g 2 g              Anesthesia Record               Intraprocedure I/O Totals          Intake    LR infusion 900.00 mL    Total Intake 900 mL          Specimen: No specimens collected     Staff:   Circulator: Joe Corrigan Jr., RN; Ryan Gonzales RN  Scrub Person: Roseline Clark; Jasper Amos         Drains and/or Catheters: * None in log *    Tourniquet Times:         Implants:  Implants       Type Name Action Serial No.      Joint  WIRE, NITINOL - [id number] Implanted       28MM REUNION GLENOID BASEPLATE Implanted      Screw SCREW, CENTER, 6.5MM X 32MM - [id number] Implanted      Joint SCREW, PERIPHERAL, 4.5MM X 24MM - [id number] Implanted      Joint SCREW, PERIPHERAL, 4.5MM X 24MM - [id number] Implanted      Joint SCREW, PERIPHERAL, 4.5MM X 16MM - [id number] Implanted      Joint GLENOSPHERE, CONCENTRIC, 32MM, DENISE X 2MM THK - [id number] Implanted      Joint INSERT, X3 HUMERAL, 32 X 4MM, STANDARD - [id number] Implanted      Joint CUP, HUMERAL,  32MM, DENISE X 2MM [facility] - -2952 - [id number] Implanted [id number]     Stem FEI HUMERAL STEM Implanted [id number]              Findings: Apsley no rotator cuff notable.  Biceps tendon degeneration.  Significant glenohumeral arthritis.    Complications:  None; patient tolerated the procedure well.    Disposition: PACU - hemodynamically stable.  Condition: stable       Brief clinical note:    Patient presents with rotator cuff arthropathy to the right shoulder.  Patient had significant pain and discomfort.  The patient is failed conservative measures.  The patient presents today for reverse total shoulder arthroplasty.  The risks and benefits of surgery were discussed to include residual pain discomfort loosening dislocation iatrogenic fracture medical and anesthetic risks among others.  The patient is consented and marked.    Operative note:    Patient undergoes a block in the presents to the operating room.  The patient is prepped and draped in the usual manner.  A final timeout is done with the operative team.  The patient did receive an antibiotic.  The area and anatomy is marked out.  Incision is carried out per standard anterior pectoral approach.  Dissection is carried out from superior to inferior.  The flaps were meticulously elevated.  Bleeders are cauterized.  And the interval between the anterior and middle deltoid is identified and cephalic vein is protected and retracted laterally.  Dissection is carried out.  A portion of the pec is released fairly.  The arm is externally rotated.  The deltopectoral fascia was incised from proximal distals.  The patient has significant vessels noted in the subscap region and therefore those were tied off with Vicryl and both sides and then dissected out.  Once that is performed the interval between the deltoid medially is identified.  Unfortunately 1 can appreciate no significant rotator cuff appreciable.  The patient's biceps tendon has significant degeneration  superiorly near the attachment at the joint level.  Therefore is elected to resect the biceps distally and do a biceps tenodesis.  This is done with FiberWire suture at the level of the inferior aspect intervention trabecular groove.  Once that is performed we dressed the joint.    Incision is carried out and whenever capsule is identified from superior inferiorly that is tagged for later repair.  Any osteophytes were then removed that are visible initially in that arm is externally rotated all the tissue was removed around the humeral head specially the inferior portion where there is some osteophytes.  And head is now prepared for resection.  Using the [employer] guide to the appropriately placed the retroversion a cut is then placed in the humeral head removing that humeral head portion.  Good cut is appreciated and the bone quality is very nice.  A protector was then placed over the humeral head and attention is turned to the glenoid.    Using the posterior rim retractor the glenoid is identified.  The labrum was excised circumferentially around the glenoid.  The patient has a pretty small glenoid noted using the 32 mm guide and wires placed in the glenoid.  It is then reamed down to a smooth margin.  Any excess labrum and no debris is removed from the area.  The area is now prepared for the 32 glenoid.  This is then applied intrudes technique with the screw and set up for a size 36 screw.  Glenoid baseplate was then flushed down to the cortex.  The superior inferior and the lateral screw was then utilized with a locking screw was applied.  These were premeasured and placed appropriately.  This completes the placement of the baseplate and a 32 mm head is then applied over the baseplate per standard technique.  This completes the glenoid fixation and attention turned back to to the humeral side.    Humerus is dislocated anteriorly to get access to protector was removed.  A centering guide is then applied down the  shaft.  She is sequential broaches were then applied to a size 10 which gave excellent fit and good snug nature to them.  These were then selected.  Broach was then applied to a size 10 and as with the size 10 reamer this gave an excellent fit.  Trials were then done off the size 10 stem and the smaller cup excellent fixation was obtained stability and excellent range of motion.  Therefore the broach and the trials were removed and the final humeral components were then applied in the same manner.  Final reduction is done.  The final reduction was done after the area was washed out further.  As with the trials excellent stability and fixation was obtained with a press-fit stem.  Area was further irrigated.  The area was then closed with FiberWire suture.  There was washed out again and final closure was obtained in multiple layers with Vicryl Monocryl and strata fix and Methyl-Plex dressing.  Sling and swath was applied in addition to the ice wrap for the ice machine.  Patient tolerated procedure well without complication.  Per her own request the patient will be discharged home following the recovery room as long as she has no postoperative issues with anesthesia.    This was assisted by Dr. Lamont Patten who assisted throughout the integral portions the patient including placement of final componentry.  Alphonso Matthews  Phone Number: [phone]

## 2024-05-14 NOTE — ANESTHESIA PREPROCEDURE EVALUATION
Patient: Aleksandra Mejía    Procedure Information       Anesthesia Start Date/Time: 05/14/24 0811    Procedure: Right reverse total shoulder arthroplasty (Right: Shoulder)    Location: [facility] OR 10 / [facility] OR    Surgeons: Alphonso Matthews MD          Vitals:    05/14/24 0643   BP: 178/89   Pulse: 84   Resp: 20   Temp: 36.3 °C (97.3 °F)       Past Surgical History:   Procedure Laterality Date    SHOULDER SURGERY      arthroscopy- over 30 years ago     Past Medical History:   Diagnosis Date    Hyperlipidemia     Hypertension     PONV (postoperative nausea and vomiting)     Shingles     Type 2 diabetes mellitus (Multi)        Current Facility-Administered Medications:     ceFAZolin in dextrose (iso-os) (Ancef) IVPB 2 g, 2 g, intravenous, Once, Alphonso Matthews MD  Prior to Admission medications    Medication Sig Start Date End Date Taking? Authorizing Provider   amLODIPine (Norvasc) 5 mg tablet Take 2 tablets (10 mg) by mouth once daily at bedtime.   Yes Historical Provider, MD   atorvastatin (Lipitor) 40 mg tablet Take 1 tablet (40 mg) by mouth once daily at bedtime.   Yes Historical Provider, MD   chlorhexidine (Peridex) 0.12 % solution Swish and spit 15 ml for 2 doses, 15mL the night before surgery and 15 ml morning of surgery - swish for 30 seconds -DO NOT SWALLOW, SPIT OUT 4/30/24  Yes BLAKE Simons-CNP   chlorthalidone (Hygroton) 25 mg tablet Take 1 tablet (25 mg) by mouth once daily at bedtime.   Yes Historical Provider, MD   dulaglutide (Trulicity) 4.5 mg/0.5 mL pen injector Inject 4.5 mg under the skin 1 (one) time per week. On Saturdays   Yes Historical Provider, MD   lidocaine 4 % patch Place 1 patch over 12 hours on the skin once daily. Remove & discard patch within 12 hours or as directed by MD. 4/5/24  Yes Lazaro Solomon,    lisinopril 20 mg tablet Take 1 tablet (20 mg) by mouth once daily at bedtime.   Yes Historical Provider, MD   metFORMIN  mg 24 hr tablet Take 2 tablets (1,000  "mg) by mouth 2 times a day with meals.   Yes Historical Provider, MD     Allergies   Allergen Reactions    Latex Shortness of breath, Other and Swelling     Trouble breathing     Social History     Tobacco Use    Smoking status: Never    Smokeless tobacco: Never   Substance Use Topics    Alcohol use: Never         Chemistry    Lab Results   Component Value Date/Time     04/30/2024 1048    K 5.2 04/30/2024 1048    CL 99 04/30/2024 1048    CO2 25 04/30/2024 1048    BUN 36 (H) 04/30/2024 1048    CREATININE 1.03 04/30/2024 1048    Lab Results   Component Value Date/Time    CALCIUM 10.8 (H) 04/30/2024 1048          Lab Results   Component Value Date/Time    WBC 24.6 (H) 04/30/2024 1048    HGB 19.2 (H) 04/30/2024 1048    HCT 64.6 (H) 04/30/2024 1048     (H) 04/30/2024 1048     No results found for: \"PROTIME\", \"PTT\", \"INR\"  Encounter Date: 04/30/24   ECG 12 Lead   Result Value    Ventricular Rate 71    Atrial Rate 71    NJ Interval 164    QRS Duration 80    QT Interval 382    QTC Calculation(Bazett) 415    P Axis 41    R Axis 3    T Axis 20    QRS Count 12    Q Onset 222    P Onset 140    P Offset 194    T Offset 413    QTC Fredericia 404    Narrative    Normal sinus rhythm  Possible Left atrial enlargement  Otherwise normal ECG  No previous ECGs available  Confirmed by Lobo Pitts ([id number]) on 5/3/2024 8:19:31 AM        Relevant Problems   No relevant active problems       Clinical information reviewed:   Tobacco  Allergies  Meds   Med Hx  Surg Hx   Fam Hx  Soc Hx        NPO Detail:  NPO/Void Status  Carbohydrate Drink Given Prior to Surgery? : N  Date of Last Liquid: 05/13/24  Time of Last Liquid: 2200  Date of Last Solid: 05/13/24  Time of Last Solid: 2100  Last Intake Type: Clear fluids  Time of Last Void: 0644         Physical Exam    Airway  Mallampati: II  TM distance: >3 FB  Neck ROM: full     Cardiovascular - normal exam  Rhythm: regular  Rate: normal     Dental - normal exam     Pulmonary - " normal exam     Abdominal - normal exam  Abdomen: soft             Anesthesia Plan    History of general anesthesia?: yes  History of complications of general anesthesia?: no    ASA 3     general and regional     The patient is not a current smoker.  Patient was previously instructed to abstain from smoking on day of procedure.  Patient did not smoke on day of procedure.  Education provided regarding risk of obstructive sleep apnea.  intravenous induction   Postoperative administration of opioids is intended.  Anesthetic plan and risks discussed with patient.  Use of blood products discussed with patient who.    Plan discussed with CAA.

## 2024-05-14 NOTE — DISCHARGE INSTRUCTIONS
Please discharge the patient to home.  The patient has ice machine should utilize that over the next 48 to 72 hours at a minimum.  Patient may remove sling and ice pack and shower in 48 hours.  Can do elbow and wrist range of motion only.  Reapply splint and ice wrap.  Any concerns please call the office.  Follow-up in the office in 7 to 10 days.  [phone].  Prescriptions already sent to her pharmacy including narcotic pain medicine.

## 2024-05-14 NOTE — ANESTHESIA PROCEDURE NOTES
Peripheral Block    Patient location during procedure: pre-op  Start time: 5/14/2024 7:59 AM  End time: 5/14/2024 8:04 AM  Reason for block: at surgeon's request and post-op pain management  Staffing  Performed: attending   Authorized by: Santana Mathew DO    Performed by: Santana Mathew DO  Preanesthetic Checklist  Completed: patient identified, IV checked, site marked, risks and benefits discussed, surgical consent, monitors and equipment checked, pre-op evaluation and timeout performed   Timeout performed at: 5/14/2024 7:58 AM  Peripheral Block  Patient position: laying flat  Prep: ChloraPrep  Patient monitoring: heart rate and continuous pulse ox  Block type: interscalene  Injection technique: single-shot  Guidance: ultrasound guided  Local infiltration: ropivacaine  Needle  Needle type: Tuohy   Needle gauge: 26 G  Needle length: 5 cm  Needle localization: ultrasound guidance     image stored in chart  Assessment  Injection assessment: negative aspiration for heme, no paresthesia on injection, incremental injection and local visualized surrounding nerve on ultrasound  Additional Notes  Interscalene brachial plexus block: Informed consent obtained.  Risks and benefits discussed.  ASA monitors placed, timeout performed.  Patient position, prepped with chlorhexidine, and draped with sterile towels.  Ultrasound guidance used to visualize the brachial plexus and surrounding structures with visualization of the needle throughout duration of the procedure.  Aspiration was negative.  20 cc 0.5% bupivicaine injected.  Patient tolerated procedure well.      Timeout by 0755

## (undated) DEVICE — Device

## (undated) DEVICE — BLADE, SAGITTAL, THIN, SHORT, LF

## (undated) DEVICE — INTERPULSE HANDPIECE SET W/ 10FT SUCTION TUBING

## (undated) DEVICE — SUTURE, MONOCRYL, 3-0, 27 IN, PS-2, UNDYED

## (undated) DEVICE — TOWEL PACK, STERILE, 4/PACK, BLUE

## (undated) DEVICE — TISSUE ADHESIVE, PREMIERPRO EXOFIN, PRECISION PEN HV, 1.0ML

## (undated) DEVICE — DRESSING, GAUZE, PETROLATUM, PATCH, XEROFORM, 1 X 8 IN, STERILE

## (undated) DEVICE — SUTURE, STRATAFIX, SPIRAL MONOCRYL PLUS, 3-0, PS-1 70CM, UNDYED

## (undated) DEVICE — DRESSING, ABDOMINAL, WET PRUF, TENDERSORB, 5 X 9 IN, STERILE

## (undated) DEVICE — DRAPE, INCISE, ANTIMICROBIAL, IOBAN 2, STERI DRAPE, 23 X 33 IN, DISPOSABLE, STERILE

## (undated) DEVICE — GLOVE, SURGICAL, PROTEXIS PI MICRO, 8.0, PF, LF

## (undated) DEVICE — HIGH FLOW TIP FOR INTERPULSE HANDPIECE SET

## (undated) DEVICE — DRESSING, MEPILEX BORDER, POST-OP AG, 4 X 10 IN

## (undated) DEVICE — TAPE, SURGICAL, FOAM, MICROFOAM, HYPOALLERGENIC, 3 IN X 5.5 YD

## (undated) DEVICE — DRESSING, GAUZE, SUPER KERLIX, 6X6

## (undated) DEVICE — TIP, SUCTION, YANKAUER, FLEXIBLE

## (undated) DEVICE — APPLICATOR, CHLORAPREP, W/ORANGE TINT, 26ML

## (undated) DEVICE — SOLUTION, IRRIGATION, SODIUM CHLORIDE 0.9%, 1000 ML, POUR BOTTLE

## (undated) DEVICE — SUTURE, VICRYL, 0, 27 IN, CP-1, UNDYED

## (undated) DEVICE — SUTURE, FIBERWIRE 2, T-5 TAPER NEEDLE, 38"

## (undated) DEVICE — SOLUTION, IRRIGATION, STERILE WATER, 1000 ML, POUR BOTTLE

## (undated) DEVICE — NEEDLE, ELECTRODE, ELECTROSURGICAL, INSULATED

## (undated) DEVICE — DRAPE, SHEET, U, STERI DRAPE, 47 X 51 IN, DISPOSABLE, STERILE

## (undated) DEVICE — DRAPE, INSTRUMENT, W/POUCH, STERI DRAPE, 7 X 11 IN, DISPOSABLE, STERILE